# Patient Record
Sex: FEMALE | Race: WHITE | NOT HISPANIC OR LATINO | Employment: FULL TIME | URBAN - METROPOLITAN AREA
[De-identification: names, ages, dates, MRNs, and addresses within clinical notes are randomized per-mention and may not be internally consistent; named-entity substitution may affect disease eponyms.]

---

## 2021-03-25 ENCOUNTER — OFFICE VISIT (OUTPATIENT)
Dept: PULMONOLOGY | Facility: CLINIC | Age: 51
End: 2021-03-25

## 2021-03-25 VITALS
HEART RATE: 79 BPM | DIASTOLIC BLOOD PRESSURE: 68 MMHG | WEIGHT: 157 LBS | SYSTOLIC BLOOD PRESSURE: 121 MMHG | TEMPERATURE: 98 F | RESPIRATION RATE: 16 BRPM | OXYGEN SATURATION: 97 %

## 2021-03-25 DIAGNOSIS — D71 CHRONIC GRANULOMATOUS DISEASE (HCC): Primary | ICD-10-CM

## 2021-03-25 PROCEDURE — 99203 OFFICE O/P NEW LOW 30 MIN: CPT | Performed by: INTERNAL MEDICINE

## 2021-03-25 RX ORDER — CETIRIZINE HYDROCHLORIDE 10 MG/1
10 TABLET ORAL DAILY
COMMUNITY

## 2021-03-25 RX ORDER — MONTELUKAST SODIUM 10 MG/1
10 TABLET ORAL NIGHTLY
COMMUNITY

## 2021-03-25 RX ORDER — CITALOPRAM 20 MG/1
20 TABLET ORAL DAILY
COMMUNITY

## 2021-03-25 RX ORDER — SPIRONOLACTONE 25 MG/1
25 TABLET ORAL DAILY
COMMUNITY

## 2021-03-25 RX ORDER — MECLIZINE HYDROCHLORIDE 25 MG/1
25 TABLET ORAL 3 TIMES DAILY PRN
COMMUNITY
Start: 2021-02-11

## 2021-03-25 NOTE — PROGRESS NOTES
New Patient Pulmonary Office Visit      Patient Name: Laly Sanabria    Referring Physician: No ref. provider found    Chief Complaint:    Chief Complaint   Patient presents with   • Lung Nodule   • Shortness of Breath       History of Present Illness: Laly Sanabria is a 50 y.o. female who is here today to establish care with Pulmonary.  Patient's past medical history significant for anxiety, and allergic rhinitis.  He presents to pulmonary for evaluation of pulmonary nodule.  Patient noted that she had some type of a raised area on her chest and they decided to get a CT.  When they got the CT of the chest she was found to have some small nodules 3 to 4 mm in size in the upper lobes and some calcified nodules in the lower lobes.  She denies any odd pets.  Has no birds.  No exposure history does not feel sick.  Denies any chest pain, shortness of breath, fever, or chills.    Review of Systems:   Review of Systems   Constitutional: Negative for activity change, appetite change, chills and diaphoresis.   HENT: Negative for congestion, postnasal drip, sinus pressure and voice change.    Eyes: Negative for blurred vision.   Respiratory: Negative for cough, shortness of breath and wheezing.    Cardiovascular: Negative for chest pain.   Gastrointestinal: Negative for abdominal pain.   Musculoskeletal: Negative for myalgias.   Skin: Negative for color change and dry skin.   Allergic/Immunologic: Negative for environmental allergies.   Neurological: Negative for weakness and confusion.   Hematological: Negative for adenopathy.   Psychiatric/Behavioral: Negative for sleep disturbance and depressed mood.       Past Medical History:   Past Medical History:   Diagnosis Date   • Allergic rhinitis    • Anxiety        Past Surgical History:   Past Surgical History:   Procedure Laterality Date   • SHOULDER SURGERY     • SINUS SURGERY     • WISDOM TOOTH EXTRACTION         Family History: History reviewed. No pertinent family  history.    Social History:   Social History     Socioeconomic History   • Marital status:      Spouse name: Not on file   • Number of children: Not on file   • Years of education: Not on file   • Highest education level: Not on file   Tobacco Use   • Smoking status: Former Smoker     Packs/day: 0.20     Years: 5.00     Pack years: 1.00     Types: Cigarettes     Quit date:      Years since quittin.2   • Smokeless tobacco: Never Used   Substance and Sexual Activity   • Alcohol use: Never   • Drug use: Never   • Sexual activity: Defer       Medications:     Current Outpatient Medications:   •  cetirizine (zyrTEC) 10 MG tablet, Take 10 mg by mouth Daily., Disp: , Rfl:   •  citalopram (CeleXA) 20 MG tablet, Take 20 mg by mouth Daily., Disp: , Rfl:   •  meclizine (ANTIVERT) 25 MG tablet, Take 25 mg by mouth 3 (Three) Times a Day As Needed., Disp: , Rfl:   •  montelukast (SINGULAIR) 10 MG tablet, Take 10 mg by mouth Every Night., Disp: , Rfl:   •  spironolactone (ALDACTONE) 25 MG tablet, Take 25 mg by mouth Daily., Disp: , Rfl:     Allergies:   No Known Allergies    Physical Exam:  Vital Signs:   Vitals:    21 1418   BP: 121/68   BP Location: Left arm   Patient Position: Sitting   Cuff Size: Adult   Pulse: 79   Resp: 16   Temp: 98 °F (36.7 °C)   SpO2: 97%  Comment: room air at rest   Weight: 71.2 kg (157 lb)       Physical Exam  Vitals and nursing note reviewed.   Constitutional:       General: She is not in acute distress.     Appearance: She is well-developed and normal weight. She is not ill-appearing or toxic-appearing.   HENT:      Head: Normocephalic and atraumatic.   Cardiovascular:      Rate and Rhythm: Normal rate and regular rhythm.      Pulses: Normal pulses.      Heart sounds: Normal heart sounds. No murmur heard.   No friction rub. No gallop.    Pulmonary:      Effort: Pulmonary effort is normal. No respiratory distress.      Breath sounds: Normal breath sounds. No wheezing, rhonchi or  rales.   Musculoskeletal:      Right lower leg: No edema.      Left lower leg: No edema.   Skin:     General: Skin is warm and dry.   Neurological:      Mental Status: She is alert and oriented to person, place, and time.           There is no immunization history on file for this patient.    Results Review:   - I personally reviewed the pts imaging from CT scan of the chest from 3/12/2021 which showed signs of chronic granulomatous disease with small noncalcified nodules 3 to 4 mm in size.  - I personally reviewed the pts chart.    Assessment / Plan:   1. Chronic granulomatous disease (CMS/HCC) (Primary)  -Patient's imaging is very consistent with chronic granulomatous disease.  3 to 4 mm nodules per Fleischner guidelines should not be followed any further CT imaging.  Explained this to the patient.  In addition to this she has no other pulmonary symptoms.  This is likely due to dental infection that she has dental as a child.  No further imaging is required.  Follow-up as needed.    Follow Up:   Return if symptoms worsen or fail to improve.     ZACKERY Welch, DO  Pulmonary and Critical Care Medicine  Note Electronically Signed    Please note that portions of this note may have been completed with a voice recognition program. Efforts were made to edit the dictations, but occasionally words are mistranscribed.